# Patient Record
Sex: MALE | Race: WHITE
[De-identification: names, ages, dates, MRNs, and addresses within clinical notes are randomized per-mention and may not be internally consistent; named-entity substitution may affect disease eponyms.]

---

## 2019-12-27 ENCOUNTER — HOSPITAL ENCOUNTER (INPATIENT)
Dept: HOSPITAL 74 - YASAS | Age: 49
LOS: 4 days | Discharge: HOME | DRG: 773 | End: 2019-12-31
Attending: ALLERGY & IMMUNOLOGY | Admitting: ALLERGY & IMMUNOLOGY
Payer: COMMERCIAL

## 2019-12-27 VITALS — BODY MASS INDEX: 32.6 KG/M2

## 2019-12-27 DIAGNOSIS — J43.9: ICD-10-CM

## 2019-12-27 DIAGNOSIS — F10.230: Primary | ICD-10-CM

## 2019-12-27 DIAGNOSIS — F11.20: ICD-10-CM

## 2019-12-27 DIAGNOSIS — F17.213: ICD-10-CM

## 2019-12-27 DIAGNOSIS — J45.909: ICD-10-CM

## 2019-12-27 DIAGNOSIS — B18.2: ICD-10-CM

## 2019-12-27 DIAGNOSIS — Z91.013: ICD-10-CM

## 2019-12-27 DIAGNOSIS — Z59.0: ICD-10-CM

## 2019-12-27 DIAGNOSIS — F19.24: ICD-10-CM

## 2019-12-27 DIAGNOSIS — G47.00: ICD-10-CM

## 2019-12-27 LAB
ALBUMIN SERPL-MCNC: 4.2 G/DL (ref 3.4–5)
ALP SERPL-CCNC: 90 U/L (ref 45–117)
ALT SERPL-CCNC: 44 U/L (ref 13–61)
ANION GAP SERPL CALC-SCNC: 6 MMOL/L (ref 8–16)
AST SERPL-CCNC: 30 U/L (ref 15–37)
BILIRUB SERPL-MCNC: 0.3 MG/DL (ref 0.2–1)
BUN SERPL-MCNC: 13.2 MG/DL (ref 7–18)
CALCIUM SERPL-MCNC: 9.4 MG/DL (ref 8.5–10.1)
CHLORIDE SERPL-SCNC: 107 MMOL/L (ref 98–107)
CO2 SERPL-SCNC: 25 MMOL/L (ref 21–32)
CREAT SERPL-MCNC: 0.8 MG/DL (ref 0.55–1.3)
DEPRECATED RDW RBC AUTO: 13.8 % (ref 11.9–15.9)
GLUCOSE SERPL-MCNC: 82 MG/DL (ref 74–106)
HCT VFR BLD CALC: 41.8 % (ref 35.4–49)
HGB BLD-MCNC: 14 GM/DL (ref 11.7–16.9)
MCH RBC QN AUTO: 29.6 PG (ref 25.7–33.7)
MCHC RBC AUTO-ENTMCNC: 33.5 G/DL (ref 32–35.9)
MCV RBC: 88.1 FL (ref 80–96)
PLATELET # BLD AUTO: 335 K/MM3 (ref 134–434)
PMV BLD: 8.2 FL (ref 7.5–11.1)
POTASSIUM SERPLBLD-SCNC: 4.1 MMOL/L (ref 3.5–5.1)
PROT SERPL-MCNC: 7.7 G/DL (ref 6.4–8.2)
RBC # BLD AUTO: 4.75 M/MM3 (ref 4–5.6)
SODIUM SERPL-SCNC: 139 MMOL/L (ref 136–145)
WBC # BLD AUTO: 11.3 K/MM3 (ref 4–10)

## 2019-12-27 PROCEDURE — HZ2ZZZZ DETOXIFICATION SERVICES FOR SUBSTANCE ABUSE TREATMENT: ICD-10-PCS | Performed by: ALLERGY & IMMUNOLOGY

## 2019-12-27 RX ADMIN — NICOTINE POLACRILEX PRN MG: 2 GUM, CHEWING ORAL at 19:00

## 2019-12-27 RX ADMIN — BUDESONIDE AND FORMOTEROL FUMARATE DIHYDRATE SCH: 80; 4.5 AEROSOL RESPIRATORY (INHALATION) at 22:02

## 2019-12-27 RX ADMIN — TRAZODONE HYDROCHLORIDE SCH MG: 100 TABLET ORAL at 22:02

## 2019-12-27 RX ADMIN — Medication SCH MG: at 22:01

## 2019-12-27 SDOH — ECONOMIC STABILITY - HOUSING INSECURITY: HOMELESSNESS: Z59.0

## 2019-12-27 NOTE — HP
CIWA Score


Nausea/Vomitin-Int. Nausea w/Dry Heave


Muscle Tremors: 4-Moderate,w/Arms Extend


Anxiety: 2


Agitation: 3


Paroxysmal Sweats: 1-Minimal Palms Moist


Orientation: 0-Oriented


Tacttile Disturbances: 0-None


Auditory Disturbances: 0-None


Visual Disturbances: 0-None


Headache: 2-Mild


CIWA-Ar Total Score: 16





- Admission Criteria


[facility] Guidelines: Admission for Medically Managed Detox: 


Requires at least one of the followin. CIWA greater than 12


2. Seizures within the past 24 hours


3. Delirium tremens within the past 24 hours


4. Hallucinations within the past 24 hours


5. Acute intervention needed for co  occurring medical disorder


6. Acute intervention needed for co  occurring psychiatric disorder


7. Severe withdrawal that cannot be handled at a lower level of care (continued


    vomiting, continued diarrhea, abnormal vital signs) requiring intravenous


    medication and/or fluids


8. Pregnancy








Admitting History and Physical





- Admission


History of Present Illness: 





49 year old male with history of alcohol dependence with withdrawals.  He is 

also has been drinking heavily up to 4 pints of vodka daily.  He had a blackout 

2 days ago and needs an eye opener drink every morning before the start of the 

day.  He denies having withdrawals.  Last year he completed HCV treatment and 

was cured.  But his drinking persists and has actually gotten worse.  He is on 

suboxone treatment and is stable on suboxone 8mg daily.  


He smokes 1ppd daily for 30 years.


PMH:  Asthma


Psurg:  None


Psych:  None


He has support systems in family and friends.  He is currently homeless and was 

 3 years ago and then rapidly deteriorated.





History Source: Patient


Limitations to Obtaining History: No Limitations





- Past Medical History


Cardiovascular: Yes: HTN





- Smoking History


Smoking history: Current every day smoker


Have you smoked in the past 12 months: Yes


Aproximately how many cigarettes per day: 10





- Alcohol/Substance Use


Hx Alcohol Use: Yes (three pints Vodka daily)





Admission [facility]


Allergies/Adverse Reactions: 


 Allergies











Allergy/AdvReac Type Severity Reaction Status Date / Time


 


fish derived Allergy Mild Rash Verified 19 11:16


 


No Known Drug Allergies Allergy   Verified 19 11:16


 


nkda Allergy   Uncoded 19 11:16











Exam Limitations: No Limitations





- Ebola screening


Have you traveled outside of the country in the last 21 days: No


Have you had contact with anyone from an Ebola affected area: No


Have you been sick,other than usual withdrawal symptoms: No


Do you have a fever: No





Patient History





- Patient Medical History


Hx Anemia: No


Hx Asthma: Yes


Hx Chronic Obstructive Pulmonary Disease (COPD): No


Hx Cancer: No


Hx Cardiac Disorders: No


Hx Congestive Heart Failure: No


Hx Hypertension: No


Hx Hypercholesterolemia: Yes (NO MED)


Hx Pacemaker: No


HX Cerebrovascular Accident: No


Hx Seizures: No


Hx Dementia: No


Hx Diabetes: No


Hx Gastrointestinal Disorders: No


Hx Liver Disease: Yes (HCV treated)


Hx Genitourinary Disorders: No


Hx Sexually Transmitted Disorders: No


Hx Renal Disease (ESRD): No


Hx Thyroid Disease: No


Hx Human Immunodeficiency Virus (HIV): No (negative 1 week ago)


Hx Hepatitis C: Yes (treated and cured)


Hx Depression: Yes


Hx Suicide Attempt: No


Hx Bipolar Disorder: No


Hx Schizophrenia: No





- Patient Surgical History


Past Surgical History: Yes


Hx Neurologic Surgery: No


Hx Cataract Extraction: No


Hx Cardiac Surgery: No


Hx Lung Surgery: No


Hx Breast Surgery: No


Hx Breast Biopsy: No


Hx Abdominal Surgery: No


Hx Appendectomy: No


Hx Cholecystectomy: No


Hx Genitourinary Surgery: No


Hx  Section: No


Hx Orthopedic Surgery: No


Other Surgical History: surgery for pyloric stenosis as an  infant


Anesthesia Reaction: No





- PPD History


Previous Implant?: Yes


Documented Results: Negative w/o proof


Implanted On Prior [facility] Admission?: No


Date: 04/27/15


Results: 0mm


PPD to be Administered?: Yes





- Smoking Cessation


Smoking history: Current every day smoker


Have you smoked in the past 12 months: Yes


Aproximately how many cigarettes per day: 10


Hx Chewing Tobacco Use: No


Initiated information on smoking cessation: Yes


'Breaking Loose' booklet given: 19





- Substances abused


  ** Alcohol


Substance route: Oral


Frequency: Daily


Amount used: 4 pints of vodka & 12 beers


Age of first use: 16


Date of last use: 19





Admission Physical Exam [facility]





- Vital Signs


Vital Signs: 


 Vital Signs - 24 hr











  19





  11:15


 


Temperature 99.4 F


 


Pulse Rate 96 H


 


Respiratory 20





Rate 


 


Blood Pressure 148/91














- Physical


General Appearance: Yes: Mild Distress, Alcohol on Breath, Tremorous, Irritable

, Sweating


HEENTM: Yes: EOMI, Hearing grossly Normal, Normal ENT Inspection, Normocephalic

, Normal Voice, YVONNE, Pharynx Normal, Tm's normal


Respiratory: Yes: Chest Non-Tender, Lungs Clear, Normal Breath Sounds, No 

Respiratory Distress, No Accessory Muscle Use


Neck: Yes: No masses,lesions,Nodules, Supple, Trachea in good position


Breast: Yes: Within Normal Limits


Cardiology: Yes: Regular Rhythm, Regular Rate, S1, S2


Abdominal: Yes: Non Tender, Soft, Increased Bowel Sounds, Protuberent


Genitourinary: Yes: Within Normal Limits


Back: Yes: Normal Inspection


Musculoskeletal: Yes: full range of Motion, Gait Steady, Pelvis Stable


Extremities: Yes: Normal Capillary Refill, Normal Inspection, Normal Range of 

Motion, Non-Tender


Neurological: Yes: CNs II-XII NML intact, Fully Oriented, Alert, Motor Strength 

5/5, Normal Mood/Affect, Normal Response


Integumentary: Yes: Normal Color, Warm


Lymphatic: Yes: Within Normal Limits





Cleared for Admission S





- Detox or Rehab


[facility] Level of Care: Medically Managed





Breathalyzer





- Breathalyzer


Breathalyzer: 0.006





Urine Drug Screen





- Test Device


Lot number: [id number]


Expiration date: 21





- Control


Is test valid?: Yes





- Results


Drug screen NEGATIVE: No


Urine drug screen results: BZO-Benzodiazepines, BUP-Suboxone





Inpatient Rehab Admission





- Rehab Decision to Admit


Inpatient rehab admission?: No

## 2019-12-27 NOTE — CONSULT
[facility] Psychiatric Consult





- Data


Date of interview: 12/27/19


Admission source: [facility]


Identifying data: Revisit to [facility] and admission to [address] [place] [address] for this 48 y/o 

 male who sought detoxification treatment. LIBIA issues : alcohol, opioid

, nicotine. Patient is , no dependents, homeless, unemployed and 

currently supported on undisclosed means.


Substance Abuse History: Discussed with the patient. Details in current [facility] 

report as follows : Smoking history: Current every day smoker.  Have you smoked 

in the past 12 months: Yes.  Aproximately how many cigarettes per day: 10.  Hx 

Chewing Tobacco Use: No.  Initiated information on smoking cessation: Yes.  '

Breaking Loose' booklet given: 12/27/19.  - Substances abused.  ** Alcohol.  

Substance route: Oral.  Frequency: Daily.  Amount used: 4 pints of vodka & 12 

beers.  Age of first use: 16.  Date of last use: 12/27/19


Medical History: Patient endorses good general health.


Psychiatric History: No reported history of psychiatric hospitalizations. 

Patient is currently seeing a psychiatrist, Dr Chelsey Ramsey ([phone]), at 

[facility] in [place] on a weekly basis for psychotherapy + medication 

management (buspar 30 mg/tid and trazodone 100  mg/hs). Reportedly diagnosed 

with MDD, Anxiety Disorder and Sleep Disorder. Mr Daniel Felix denies history of 

suicide attempts.


Physical/Sexual Abuse/Trauma History: History of heavy traumas : at age 20, the 

patient lost his mother to suicide ; divorce and homelessness.


Additional Comment: Urine drug screen results: BZO-Benzodiazepines, BUP-

Suboxone. Noted.





Mental Status Exam





- Mental Status Exam


Alert and Oriented to: Time, Place, Person


Cognitive Function: Good


Patient Appearance: Well Groomed


Mood: Nervous, Withdrawn


Affect: Mood Congruent, Constricted


Patient Behavior: Fatigued, Appropriate, Cooperative


Speech Pattern: Clear


Voice Loudness: Normal


Thought Process: Intact, Goal Oriented


Thought Disorder: Not Present


Hallucinations: Denies


Suicidal Ideation: Denies


Homicidal Ideation: Denies


Insight/Judgement: Poor


Sleep: Poorly, Difficulty falling asleep


Appetite: Good


Gait/Station: Normal





Psychiatric Findings





- Problem List (Axis 1, 2,3)


(1) Opioid dependence on agonist therapy


Current Visit: Yes   Status: Chronic   Comment: .   





(2) Alcohol use disorder


Current Visit: Yes   Status: Chronic   





(3) Nicotine dependence


Current Visit: Yes   Status: Chronic   Comment: .   





(4) Drug-induced mood disorder


Current Visit: Yes   Status: Chronic   Comment: .   





(5) History of depression


Current Visit: Yes   Status: Chronic   





(6) Insomnia


Current Visit: Yes   Status: Chronic   





- Initial Treatment Plan


Initial Treatment Plan: Psychoeducation. Sleep hygiene. Detoxification. AA/NA 

meetings. Resumed : trazodone 100 mg po hs. Side effects/benefits discussed 

with the patient. Mr Sanchez  is in agreement with this plan of care. Gave his 

informed consent (verbal) to MD. Declines to resume buspirone. Observation.

## 2019-12-28 RX ADMIN — BUPRENORPHINE HYDROCHLORIDE, NALOXONE HYDROCHLORIDE SCH EACH: 4; 1 FILM, SOLUBLE BUCCAL; SUBLINGUAL at 10:03

## 2019-12-28 RX ADMIN — BUSPIRONE HYDROCHLORIDE SCH MG: 10 TABLET ORAL at 13:35

## 2019-12-28 RX ADMIN — IBUPROFEN PRN MG: 400 TABLET, FILM COATED ORAL at 12:03

## 2019-12-28 RX ADMIN — BUDESONIDE AND FORMOTEROL FUMARATE DIHYDRATE SCH: 80; 4.5 AEROSOL RESPIRATORY (INHALATION) at 10:03

## 2019-12-28 RX ADMIN — BUSPIRONE HYDROCHLORIDE SCH MG: 10 TABLET ORAL at 11:12

## 2019-12-28 RX ADMIN — BUSPIRONE HYDROCHLORIDE SCH MG: 10 TABLET ORAL at 21:36

## 2019-12-28 RX ADMIN — Medication SCH MG: at 21:36

## 2019-12-28 RX ADMIN — IBUPROFEN PRN MG: 400 TABLET, FILM COATED ORAL at 18:52

## 2019-12-28 RX ADMIN — BUDESONIDE AND FORMOTEROL FUMARATE DIHYDRATE SCH PUFF: 80; 4.5 AEROSOL RESPIRATORY (INHALATION) at 21:35

## 2019-12-28 RX ADMIN — NICOTINE SCH MG: 14 PATCH, EXTENDED RELEASE TRANSDERMAL at 10:03

## 2019-12-28 RX ADMIN — TRAZODONE HYDROCHLORIDE SCH MG: 100 TABLET ORAL at 21:36

## 2019-12-28 RX ADMIN — METHOCARBAMOL PRN MG: 500 TABLET ORAL at 21:37

## 2019-12-28 RX ADMIN — Medication SCH TAB: at 10:03

## 2019-12-28 NOTE — PN
S CIWA





- CIWA Score


Nausea/Vomitin-No Nausea/No Vomiting


Muscle Tremors: 2


Anxiety: 3


Agitation: 2


Paroxysmal Sweats: 3


Orientation: 0-Oriented


Tacttile Disturbances: 1-Very Mild Itch/Numbness


Auditory Disturbances: 0-None


Visual Disturbances: 0-None


Headache: 1-Very Mild


CIWA-Ar Total Score: 12





[facility] Progress Note (SOAP)


Subjective: 





c/o headache, sweats, anxiety, and shakes.


Objective: 





19 11:39


 Vital Signs











  19





  06:48 09:23


 


Temperature 97.8 F 97.4 F L


 


Pulse Rate 72 85


 


Respiratory 18 18





Rate  


 


Blood Pressure 107/64 107/70








 Laboratory Last Values











WBC  11.3 K/mm3 (4.0-10.0)  H  19  12:10    


 


RBC  4.75 M/mm3 (4.00-5.60)   19  12:10    


 


Hgb  14.0 GM/dL (11.7-16.9)   19  12:10    


 


Hct  41.8 % (35.4-49)   19  12:10    


 


MCV  88.1 fl (80-96)   19  12:10    


 


MCH  29.6 pg (25.7-33.7)   19  12:10    


 


MCHC  33.5 g/dl (32.0-35.9)   19  12:10    


 


RDW  13.8 % (11.9-15.9)   19  12:10    


 


Plt Count  335 K/MM3 (134-434)  D 19  12:10    


 


MPV  8.2 fl (7.5-11.1)   19  12:10    


 


Sodium  139 mmol/L (136-145)   19  12:10    


 


Potassium  4.1 mmol/L (3.5-5.1)   19  12:10    


 


Chloride  107 mmol/L ()   19  12:10    


 


Carbon Dioxide  25 mmol/L (21-32)   19  12:10    


 


Anion Gap  6 MMOL/L (8-16)  L  19  12:10    


 


BUN  13.2 mg/dL (7-18)   19  12:10    


 


Creatinine  0.8 mg/dL (0.55-1.3)   19  12:10    


 


Est GFR (CKD-EPI)AfAm  123.29   19  12:10    


 


Est GFR (CKD-EPI)NonAf  106.38   19  12:10    


 


Random Glucose  82 mg/dL ()   19  12:10    


 


Calcium  9.4 mg/dL (8.5-10.1)   19  12:10    


 


Total Bilirubin  0.3 mg/dL (0.2-1)   19  12:10    


 


AST  30 U/L (15-37)   19  12:10    


 


ALT  44 U/L (13-61)   19  12:10    


 


Alkaline Phosphatase  90 U/L ()   19  12:10    


 


Total Protein  7.7 g/dl (6.4-8.2)   19  12:10    


 


Albumin  4.2 g/dl (3.4-5.0)   19  12:10    


 


RPR Titer  Nonreactive  (NONREACTIVE)   19  12:10    








Labs noted.


Assessment: 





19 11:39


AOx3, in no acute respiratory distress.


Full rom, ambulating in the unit.


Withdrawal symptoms.


Plan: 





continue detox.

## 2019-12-28 NOTE — PN
[facility] Progress Note


Note: 





Psychiatry   


Attending's note :


Approached by patient.


Issue : request for buspirone.


Mr Daniel jr states that he would like to restart buspar.


Side effects/benefits discussed with the patient. 


Informed consent (verbal) given to MD. 


Buspar 10 mg po tid. Ordered at patient's request.

## 2019-12-29 RX ADMIN — TRAZODONE HYDROCHLORIDE SCH MG: 100 TABLET ORAL at 21:30

## 2019-12-29 RX ADMIN — HYDROXYZINE PAMOATE PRN MG: 25 CAPSULE ORAL at 19:48

## 2019-12-29 RX ADMIN — NICOTINE POLACRILEX PRN MG: 2 GUM, CHEWING ORAL at 06:33

## 2019-12-29 RX ADMIN — METHOCARBAMOL PRN MG: 500 TABLET ORAL at 16:55

## 2019-12-29 RX ADMIN — BUDESONIDE AND FORMOTEROL FUMARATE DIHYDRATE SCH: 80; 4.5 AEROSOL RESPIRATORY (INHALATION) at 21:35

## 2019-12-29 RX ADMIN — HYDROXYZINE PAMOATE PRN MG: 25 CAPSULE ORAL at 13:59

## 2019-12-29 RX ADMIN — BUSPIRONE HYDROCHLORIDE SCH MG: 10 TABLET ORAL at 13:59

## 2019-12-29 RX ADMIN — NICOTINE SCH MG: 14 PATCH, EXTENDED RELEASE TRANSDERMAL at 10:08

## 2019-12-29 RX ADMIN — BUDESONIDE AND FORMOTEROL FUMARATE DIHYDRATE SCH: 80; 4.5 AEROSOL RESPIRATORY (INHALATION) at 10:08

## 2019-12-29 RX ADMIN — BUPRENORPHINE HYDROCHLORIDE, NALOXONE HYDROCHLORIDE SCH EACH: 4; 1 FILM, SOLUBLE BUCCAL; SUBLINGUAL at 10:08

## 2019-12-29 RX ADMIN — BUSPIRONE HYDROCHLORIDE SCH MG: 10 TABLET ORAL at 05:42

## 2019-12-29 RX ADMIN — Medication SCH MG: at 21:30

## 2019-12-29 RX ADMIN — BUSPIRONE HYDROCHLORIDE SCH MG: 10 TABLET ORAL at 21:30

## 2019-12-29 RX ADMIN — Medication SCH TAB: at 10:08

## 2019-12-29 NOTE — PN
[facility] CIWA





- CIWA Score


Nausea/Vomitin-Mild Nausea/No Vomiting


Muscle Tremors: 2


Anxiety: 2


Agitation: 2


Paroxysmal Sweats: 1-Minimal Palms Moist


Orientation: 0-Oriented


Tacttile Disturbances: 0-None


Auditory Disturbances: 0-None


Visual Disturbances: 0-None


Headache: 1-Very Mild


CIWA-Ar Total Score: 9





B[facility] Progress Note (SOAP)


Subjective: 





49 years old male admitted on 19 for alcohol withdrawal sx management 


treating with libirum detox regimen


receive suboxone today feeling ok ate breakfast 


discuss benefits of behavioral and psychosocial therapies 


Objective: 





19 11:57


 Vital Signs











Temperature  97.3 F L  19 09:11


 


Pulse Rate  74   19 09:11


 


Respiratory Rate  18   19 09:11


 


Blood Pressure  125/78   19 09:11


 


O2 Sat by Pulse Oximetry (%)      








 Laboratory Last Values











WBC  11.3 K/mm3 (4.0-10.0)  H  19  12:10    


 


RBC  4.75 M/mm3 (4.00-5.60)   19  12:10    


 


Hgb  14.0 GM/dL (11.7-16.9)   19  12:10    


 


Hct  41.8 % (35.4-49)   19  12:10    


 


MCV  88.1 fl (80-96)   19  12:10    


 


MCH  29.6 pg (25.7-33.7)   19  12:10    


 


MCHC  33.5 g/dl (32.0-35.9)   19  12:10    


 


RDW  13.8 % (11.9-15.9)   19  12:10    


 


Plt Count  335 K/MM3 (134-434)  D 19  12:10    


 


MPV  8.2 fl (7.5-11.1)   19  12:10    


 


Sodium  139 mmol/L (136-145)   19  12:10    


 


Potassium  4.1 mmol/L (3.5-5.1)   19  12:10    


 


Chloride  107 mmol/L ()   19  12:10    


 


Carbon Dioxide  25 mmol/L (21-32)   19  12:10    


 


Anion Gap  6 MMOL/L (8-16)  L  19  12:10    


 


BUN  13.2 mg/dL (7-18)   19  12:10    


 


Creatinine  0.8 mg/dL (0.55-1.3)   19  12:10    


 


Est GFR (CKD-EPI)AfAm  123.29   19  12:10    


 


Est GFR (CKD-EPI)NonAf  106.38   19  12:10    


 


Random Glucose  82 mg/dL ()   19  12:10    


 


Calcium  9.4 mg/dL (8.5-10.1)   19  12:10    


 


Total Bilirubin  0.3 mg/dL (0.2-1)   19  12:10    


 


AST  30 U/L (15-37)   19  12:10    


 


ALT  44 U/L (13-61)   19  12:10    


 


Alkaline Phosphatase  90 U/L ()   19  12:10    


 


Total Protein  7.7 g/dl (6.4-8.2)   19  12:10    


 


Albumin  4.2 g/dl (3.4-5.0)   19  12:10    


 


RPR Titer  Nonreactive  (NONREACTIVE)   19  12:10    








lab noted


wbc elevation


asymptomatic 


patient agrees to follow up with suboxone provider in the community





19 11:59





Assessment: 





19 11:59


alcohol withdrawal 


Plan: 





librium regimen

## 2019-12-30 RX ADMIN — TRAZODONE HYDROCHLORIDE SCH MG: 100 TABLET ORAL at 22:00

## 2019-12-30 RX ADMIN — BUPRENORPHINE HYDROCHLORIDE, NALOXONE HYDROCHLORIDE SCH EACH: 4; 1 FILM, SOLUBLE BUCCAL; SUBLINGUAL at 10:16

## 2019-12-30 RX ADMIN — BUDESONIDE AND FORMOTEROL FUMARATE DIHYDRATE SCH: 80; 4.5 AEROSOL RESPIRATORY (INHALATION) at 11:41

## 2019-12-30 RX ADMIN — BUSPIRONE HYDROCHLORIDE SCH MG: 10 TABLET ORAL at 05:31

## 2019-12-30 RX ADMIN — BUSPIRONE HYDROCHLORIDE SCH MG: 10 TABLET ORAL at 15:23

## 2019-12-30 RX ADMIN — BUDESONIDE AND FORMOTEROL FUMARATE DIHYDRATE SCH: 80; 4.5 AEROSOL RESPIRATORY (INHALATION) at 22:00

## 2019-12-30 RX ADMIN — METHOCARBAMOL PRN MG: 500 TABLET ORAL at 11:40

## 2019-12-30 RX ADMIN — HYDROXYZINE PAMOATE PRN MG: 25 CAPSULE ORAL at 16:59

## 2019-12-30 RX ADMIN — HYDROXYZINE PAMOATE PRN MG: 25 CAPSULE ORAL at 11:40

## 2019-12-30 RX ADMIN — Medication SCH: at 11:41

## 2019-12-30 RX ADMIN — Medication SCH: at 22:02

## 2019-12-30 RX ADMIN — NICOTINE SCH MG: 14 PATCH, EXTENDED RELEASE TRANSDERMAL at 10:20

## 2019-12-30 RX ADMIN — BUSPIRONE HYDROCHLORIDE SCH MG: 10 TABLET ORAL at 22:00

## 2019-12-30 NOTE — PN
S CIWA





- CIWA Score


Nausea/Vomitin-No Nausea/No Vomiting


Muscle Tremors: 2


Anxiety: 2


Agitation: 1-Slight > Activity


Paroxysmal Sweats: 1-Minimal Palms Moist


Orientation: 0-Oriented


Tacttile Disturbances: 0-None


Auditory Disturbances: 0-None


Visual Disturbances: 0-None


Headache: 0-None Present


CIWA-Ar Total Score: 6





[facility] Progress Note (SOAP)


Subjective: 





49 years old male admitted with 19 for alcohol withdrawal sx management 

treating with libirum detox regimen


requests to be discharge on 19 


one day early than estimated date of 20


reports feeling better less tremor mild anxiety


case discussed with the nurse


routine discharge is appropriated 


patient prefers to leave the detox around 6 am tomorrow 


Objective: 





19 12:16


 Vital Signs











Temperature  99.8 F H  19 09:03


 


Pulse Rate  101 H  19 09:03


 


Respiratory Rate  18   19 09:03


 


Blood Pressure  134/81   19 09:03


 


O2 Sat by Pulse Oximetry (%)      








 Laboratory Last Values











WBC  11.3 K/mm3 (4.0-10.0)  H  19  12:10    


 


RBC  4.75 M/mm3 (4.00-5.60)   19  12:10    


 


Hgb  14.0 GM/dL (11.7-16.9)   19  12:10    


 


Hct  41.8 % (35.4-49)   19  12:10    


 


MCV  88.1 fl (80-96)   19  12:10    


 


MCH  29.6 pg (25.7-33.7)   19  12:10    


 


MCHC  33.5 g/dl (32.0-35.9)   19  12:10    


 


RDW  13.8 % (11.9-15.9)   19  12:10    


 


Plt Count  335 K/MM3 (134-434)  D 19  12:10    


 


MPV  8.2 fl (7.5-11.1)   19  12:10    


 


Sodium  139 mmol/L (136-145)   19  12:10    


 


Potassium  4.1 mmol/L (3.5-5.1)   19  12:10    


 


Chloride  107 mmol/L ()   19  12:10    


 


Carbon Dioxide  25 mmol/L (21-32)   19  12:10    


 


Anion Gap  6 MMOL/L (8-16)  L  19  12:10    


 


BUN  13.2 mg/dL (7-18)   19  12:10    


 


Creatinine  0.8 mg/dL (0.55-1.3)   19  12:10    


 


Est GFR (CKD-EPI)AfAm  123.29   19  12:10    


 


Est GFR (CKD-EPI)NonAf  106.38   19  12:10    


 


Random Glucose  82 mg/dL ()   19  12:10    


 


Calcium  9.4 mg/dL (8.5-10.1)   19  12:10    


 


Total Bilirubin  0.3 mg/dL (0.2-1)   19  12:10    


 


AST  30 U/L (15-37)   19  12:10    


 


ALT  44 U/L (13-61)   19  12:10    


 


Alkaline Phosphatase  90 U/L ()   19  12:10    


 


Total Protein  7.7 g/dl (6.4-8.2)   19  12:10    


 


Albumin  4.2 g/dl (3.4-5.0)   19  12:10    


 


RPR Titer  Nonreactive  (NONREACTIVE)   19  12:10    








lab noted


patient agrees to follow up with [facility] for wbc elevation


Assessment: 





19 12:17


alcohol withdrawal


Plan: 





librium regimen

## 2019-12-31 VITALS — HEART RATE: 101 BPM | TEMPERATURE: 97.8 F | SYSTOLIC BLOOD PRESSURE: 138 MMHG | DIASTOLIC BLOOD PRESSURE: 85 MMHG

## 2019-12-31 RX ADMIN — METHOCARBAMOL PRN MG: 500 TABLET ORAL at 06:19

## 2019-12-31 RX ADMIN — BUSPIRONE HYDROCHLORIDE SCH MG: 10 TABLET ORAL at 05:40

## 2019-12-31 NOTE — DS
[facility] Detox Discharge Summary


Admission Date: 


12/27/19





Discharge Date: 12/31/19





- History


Present History: Alcohol Dependence


Additional Comments: 





49 years old male admitted on 12/27/19 for alcohol withdrawal sx management


[facility]


patient requests to be discharged a day early than estimated discharged day of 1 /1/20


case discussed with the nurse


routine discharge is appropriated 


patient left the detox around 7 am today


writer has not assessed nor evaluated the patient 





- Physical Exam Results


Vital Signs: 


 Vital Signs











Temperature  97.8 F   12/31/19 06:09


 


Pulse Rate  101 H  12/31/19 06:09


 


Respiratory Rate  18   12/31/19 06:09


 


Blood Pressure  138/85   12/31/19 06:09


 


O2 Sat by Pulse Oximetry (%)      











Pertinent Admission Physical Exam Findings: 





alcohol withdrawal


 Laboratory Last Values











WBC  11.3 K/mm3 (4.0-10.0)  H  12/27/19  12:10    


 


RBC  4.75 M/mm3 (4.00-5.60)   12/27/19  12:10    


 


Hgb  14.0 GM/dL (11.7-16.9)   12/27/19  12:10    


 


Hct  41.8 % (35.4-49)   12/27/19  12:10    


 


MCV  88.1 fl (80-96)   12/27/19  12:10    


 


MCH  29.6 pg (25.7-33.7)   12/27/19  12:10    


 


MCHC  33.5 g/dl (32.0-35.9)   12/27/19  12:10    


 


RDW  13.8 % (11.9-15.9)   12/27/19  12:10    


 


Plt Count  335 K/MM3 (134-434)  D 12/27/19  12:10    


 


MPV  8.2 fl (7.5-11.1)   12/27/19  12:10    


 


Sodium  139 mmol/L (136-145)   12/27/19  12:10    


 


Potassium  4.1 mmol/L (3.5-5.1)   12/27/19  12:10    


 


Chloride  107 mmol/L ()   12/27/19  12:10    


 


Carbon Dioxide  25 mmol/L (21-32)   12/27/19  12:10    


 


Anion Gap  6 MMOL/L (8-16)  L  12/27/19  12:10    


 


BUN  13.2 mg/dL (7-18)   12/27/19  12:10    


 


Creatinine  0.8 mg/dL (0.55-1.3)   12/27/19  12:10    


 


Est GFR (CKD-EPI)AfAm  123.29   12/27/19  12:10    


 


Est GFR (CKD-EPI)NonAf  106.38   12/27/19  12:10    


 


Random Glucose  82 mg/dL ()   12/27/19  12:10    


 


Calcium  9.4 mg/dL (8.5-10.1)   12/27/19  12:10    


 


Total Bilirubin  0.3 mg/dL (0.2-1)   12/27/19  12:10    


 


AST  30 U/L (15-37)   12/27/19  12:10    


 


ALT  44 U/L (13-61)   12/27/19  12:10    


 


Alkaline Phosphatase  90 U/L ()   12/27/19  12:10    


 


Total Protein  7.7 g/dl (6.4-8.2)   12/27/19  12:10    


 


Albumin  4.2 g/dl (3.4-5.0)   12/27/19  12:10    


 


RPR Titer  Nonreactive  (NONREACTIVE)   12/27/19  12:10    








lab noted





- Treatment


Hospital Course: Detox Protocol Followed, Detoxed Safely, Responded well, 

Discharged Condition Good, Rehab Referral Accepted


Patient has Accepted [facility]





- Medication


Discharge Medications: 


Ambulatory Orders





Albuterol Sulfate Inhaler - [Ventolin HFA Inhaler -] 0 inh IH Q4H PRN #1 inh 06/

29/15 


Budesonide/Formeterol Fumarate [SYMBICORT 80/4.5mcg -] 2 inh IH BID #1 inhaler 

06/29/15 


Buprenorphine HCl/Naloxone HCl [Suboxone 4 mg-1 mg Sl Film] 1 each SL DAILY 12/ 27/19 











- Diagnosis


(1) Alcohol dependence, continuous


Status: Acute   





(2) Asthma


Status: Chronic   





(3) COPD (chronic obstructive pulmonary disease)


Status: Chronic   


Qualifiers: 


   COPD type: emphysema   Emphysema type: unspecified   Qualified Code(s): 

J43.9 - Emphysema, unspecified   





(4) Encounter for monitoring Suboxone maintenance therapy


Status: Chronic   





(5) Hepatitis C carrier


Status: Chronic   





(6) Nicotine dependence


Status: Acute   


Qualifiers: 


   Nicotine product type: cigarettes   Substance use status: in withdrawal   

Qualified Code(s): F17.213 - Nicotine dependence, cigarettes, with withdrawal   





- AMA


Did Patient Leave Against Medical Advice: No